# Patient Record
Sex: MALE | Race: WHITE | URBAN - NONMETROPOLITAN AREA
[De-identification: names, ages, dates, MRNs, and addresses within clinical notes are randomized per-mention and may not be internally consistent; named-entity substitution may affect disease eponyms.]

---

## 2022-01-24 ENCOUNTER — OFFICE VISIT (OUTPATIENT)
Dept: URBAN - NONMETROPOLITAN AREA CLINIC 3 | Facility: CLINIC | Age: 67
End: 2022-01-24
Payer: COMMERCIAL

## 2022-01-24 DIAGNOSIS — H16.143 PUNCTATE KERATITIS, BILATERAL: ICD-10-CM

## 2022-01-24 DIAGNOSIS — H25.13 AGE-RELATED NUCLEAR CATARACT, BILATERAL: Primary | ICD-10-CM

## 2022-01-24 DIAGNOSIS — H52.4 PRESBYOPIA: ICD-10-CM

## 2022-01-24 PROCEDURE — 99203 OFFICE O/P NEW LOW 30 MIN: CPT | Performed by: OPTOMETRIST

## 2022-01-24 ASSESSMENT — INTRAOCULAR PRESSURE
OD: 18
OS: 19

## 2022-01-24 ASSESSMENT — VISUAL ACUITY
OD: 20/25
OS: 20/20

## 2022-01-24 NOTE — IMPRESSION/PLAN
Impression: Punctate keratitis, bilateral: H16.143. Plan: Discussed the diagnosis with patient. Advised use of AFT's QID/QHS/OU. Patient states understanding.